# Patient Record
Sex: FEMALE | NOT HISPANIC OR LATINO | URBAN - METROPOLITAN AREA
[De-identification: names, ages, dates, MRNs, and addresses within clinical notes are randomized per-mention and may not be internally consistent; named-entity substitution may affect disease eponyms.]

---

## 2018-11-01 ENCOUNTER — APPOINTMENT (RX ONLY)
Dept: URBAN - METROPOLITAN AREA CLINIC 161 | Facility: CLINIC | Age: 58
Setting detail: DERMATOLOGY
End: 2018-11-01

## 2018-11-01 DIAGNOSIS — L0292 CARBUNCLE AND FURUNCLE OF UNSPECIFIED SITE: ICD-10-CM | Status: RESOLVING

## 2018-11-01 DIAGNOSIS — L30.0 NUMMULAR DERMATITIS: ICD-10-CM

## 2018-11-01 DIAGNOSIS — L0293 CARBUNCLE AND FURUNCLE OF UNSPECIFIED SITE: ICD-10-CM | Status: RESOLVING

## 2018-11-01 PROBLEM — E03.9 HYPOTHYROIDISM, UNSPECIFIED: Status: ACTIVE | Noted: 2018-11-01

## 2018-11-01 PROBLEM — E13.9 OTHER SPECIFIED DIABETES MELLITUS WITHOUT COMPLICATIONS: Status: ACTIVE | Noted: 2018-11-01

## 2018-11-01 PROBLEM — E78.5 HYPERLIPIDEMIA, UNSPECIFIED: Status: ACTIVE | Noted: 2018-11-01

## 2018-11-01 PROBLEM — L85.3 XEROSIS CUTIS: Status: ACTIVE | Noted: 2018-11-01

## 2018-11-01 PROBLEM — D48.5 NEOPLASM OF UNCERTAIN BEHAVIOR OF SKIN: Status: ACTIVE | Noted: 2018-11-01

## 2018-11-01 PROBLEM — L02.433 CARBUNCLE OF RIGHT UPPER LIMB: Status: ACTIVE | Noted: 2018-11-01

## 2018-11-01 PROCEDURE — ? COUNSELING

## 2018-11-01 PROCEDURE — ? PRESCRIPTION

## 2018-11-01 PROCEDURE — 11100: CPT

## 2018-11-01 PROCEDURE — ? PATIENT SPECIFIC COUNSELING

## 2018-11-01 PROCEDURE — ? BIOPSY BY SHAVE METHOD AND DESTRUCTION

## 2018-11-01 PROCEDURE — 99203 OFFICE O/P NEW LOW 30 MIN: CPT | Mod: 25

## 2018-11-01 RX ORDER — CEPHALEXIN 500 MG/1
TWICE A DAY CAPSULE ORAL
Qty: 28 | Refills: 0 | Status: ERX | COMMUNITY
Start: 2018-11-01

## 2018-11-01 RX ORDER — TRIAMCINOLONE ACETONIDE 1 MG/G
1 CREAM TOPICAL BID
Qty: 1 | Refills: 3 | Status: ERX | COMMUNITY
Start: 2018-11-01

## 2018-11-01 RX ADMIN — TRIAMCINOLONE ACETONIDE 1: 1 CREAM TOPICAL at 00:00

## 2018-11-01 RX ADMIN — CEPHALEXIN TWICE A DAY: 500 CAPSULE ORAL at 00:00

## 2018-11-01 ASSESSMENT — LOCATION DETAILED DESCRIPTION DERM
LOCATION DETAILED: LEFT ANTERIOR DISTAL THIGH
LOCATION DETAILED: RIGHT VENTRAL PROXIMAL FOREARM
LOCATION DETAILED: RIGHT ANTERIOR DISTAL THIGH
LOCATION DETAILED: RIGHT ANTECUBITAL SKIN
LOCATION DETAILED: RIGHT ANTERIOR DISTAL UPPER ARM

## 2018-11-01 ASSESSMENT — LOCATION SIMPLE DESCRIPTION DERM
LOCATION SIMPLE: RIGHT THIGH
LOCATION SIMPLE: RIGHT UPPER ARM
LOCATION SIMPLE: RIGHT FOREARM
LOCATION SIMPLE: LEFT THIGH

## 2018-11-01 ASSESSMENT — LOCATION ZONE DERM
LOCATION ZONE: LEG
LOCATION ZONE: ARM

## 2018-11-01 NOTE — PROCEDURE: PATIENT SPECIFIC COUNSELING
Detail Level: Detailed
Exam shows eczematous patches on the arms and legs consistent with nummular eczema.  She reports that this rash has been worse earlier this summer starting in July after she was switched over from Coumadin to Eliquis.  She was taken off this mediation with minimal improvement in the rash until she started on  treatment with antibiotics and 2 courses of Medrol dose pack.  She was also prescribed a topical prescription cream, the name of which she cannot recall.  She was then started on Xarelto recently but the rash recurred; she was taken off this medication and restarted back on Coumadin.  I discussed with the patient that this rash does not appear to be a reaction to her blood thinners.  I recommend starting treatment with triamcinolone 0.1% cream.  I think it is okay for  her PCP to try to restart Eliquis or Xarelto as I believe this rash is not related to blood thinners.  In addition to triamcinolone cream, I recommend daily frequent moisturization.
Patient is on her last day of Keflex for an infection on her right antecubital fossa.  Exam shows a resolving carbuncle that is nonfluctuant on palpation.  Since the lesion has not completely receded, I  recommend extending the course of Keflex for 2 more weeks.

## 2018-11-01 NOTE — PROCEDURE: BIOPSY BY SHAVE METHOD AND DESTRUCTION
Lab Facility: [id number]
Size Of Lesion After Curettage: 0.6
Render Post-Care Instructions In Note?: yes
Anesthesia Volume In Cc: 0.5
Lab: [id number]
Bill As?: Biopsy by Shave Method
Bill 32189 For Specimen Handling/Conveyance To Laboratory?: no
Wound Care: Petrolatum
Biopsy Type: H and E
Number Of Curettages: 3
Destruction Type: electrodesiccation
Anesthesia Type: 1% lidocaine without epinephrine
Detail Level: Detailed
Post-Care Instructions: Patient will be notified of biopsy results. However, patient instructed to call the office if not contacted within 2 weeks. Written post-care instruction given.   If the biopsy results does reveal that the lesion is a non-melanoma skin cancer, the lesion has been treated at the time of the biopsy with EDC.  This is usually adequate treatment for most NMSC but the site will need to be recheck in 3-6 months to ensure adequacy of treatment as there is a 7 to 10% chance of recurrence.  If the biopsy shows a more aggressive pathology then expected then additional treatment beyond EDC may be needed.
Consent: Verbal consent obtain after discussion of possible risk of poor scarring, bleeding, infection, and recurrence.
Billing Type: Third-Party Bill
Hemostasis: Electrocautery

## 2018-11-29 ENCOUNTER — APPOINTMENT (RX ONLY)
Dept: URBAN - METROPOLITAN AREA CLINIC 161 | Facility: CLINIC | Age: 58
Setting detail: DERMATOLOGY
End: 2018-11-29

## 2018-11-29 DIAGNOSIS — L0391 CELLULITIS AND ABSCESS OF UNSPECIFIED SITES: ICD-10-CM | Status: INADEQUATELY CONTROLLED

## 2018-11-29 DIAGNOSIS — L0390 CELLULITIS AND ABSCESS OF UNSPECIFIED SITES: ICD-10-CM | Status: INADEQUATELY CONTROLLED

## 2018-11-29 PROBLEM — L03.312 CELLULITIS OF BACK [ANY PART EXCEPT BUTTOCK]: Status: ACTIVE | Noted: 2018-11-29

## 2018-11-29 PROBLEM — I10 ESSENTIAL (PRIMARY) HYPERTENSION: Status: ACTIVE | Noted: 2018-11-29

## 2018-11-29 PROCEDURE — ? ORDER TESTS

## 2018-11-29 PROCEDURE — ? PATIENT SPECIFIC COUNSELING

## 2018-11-29 PROCEDURE — ? PRESCRIPTION

## 2018-11-29 PROCEDURE — 99213 OFFICE O/P EST LOW 20 MIN: CPT

## 2018-11-29 PROCEDURE — ? COUNSELING

## 2018-11-29 RX ORDER — SULFAMETHOXAZOLE AND TRIMETHOPRIM 800; 160 MG/1; MG/1
1 TABLET ORAL BID
Qty: 28 | Refills: 0 | Status: ERX | COMMUNITY
Start: 2018-11-29

## 2018-11-29 RX ADMIN — SULFAMETHOXAZOLE AND TRIMETHOPRIM 1: 800; 160 TABLET ORAL at 00:00

## 2018-11-29 ASSESSMENT — LOCATION SIMPLE DESCRIPTION DERM: LOCATION SIMPLE: UPPER BACK

## 2018-11-29 ASSESSMENT — LOCATION DETAILED DESCRIPTION DERM: LOCATION DETAILED: SUPERIOR THORACIC SPINE

## 2018-11-29 ASSESSMENT — LOCATION ZONE DERM: LOCATION ZONE: TRUNK

## 2023-01-04 NOTE — PROCEDURE: PATIENT SPECIFIC COUNSELING
Detail Level: Simple
Swab culture taken of draining pus.  Will empirically place her on Bactrim DS BID for 2 weeks as the degree of erythema and swelling is suggestive of MRSA.  Told patient/sister that if condition does not improve over next 3-4 days, she will need to go to ED for admission for IV antibiotic.  Patient get her INR checked on weekly basis and she will let coumadin clinic know that she is on Bactrim.
  - c/w statin 80mg qhs